# Patient Record
Sex: MALE | URBAN - METROPOLITAN AREA
[De-identification: names, ages, dates, MRNs, and addresses within clinical notes are randomized per-mention and may not be internally consistent; named-entity substitution may affect disease eponyms.]

---

## 2024-09-27 ENCOUNTER — APPOINTMENT (RX ONLY)
Dept: URBAN - METROPOLITAN AREA CLINIC 109 | Facility: CLINIC | Age: 19
Setting detail: DERMATOLOGY
End: 2024-09-27

## 2024-09-27 DIAGNOSIS — L20.89 OTHER ATOPIC DERMATITIS: ICD-10-CM | Status: INADEQUATELY CONTROLLED

## 2024-09-27 PROCEDURE — ? TREATMENT REGIMEN

## 2024-09-27 PROCEDURE — ? COUNSELING

## 2024-09-27 PROCEDURE — 99204 OFFICE O/P NEW MOD 45 MIN: CPT

## 2024-09-27 PROCEDURE — ? PRESCRIPTION

## 2024-09-27 RX ORDER — CLOBETASOL PROPIONATE 0.05 G/100ML
LOTION TOPICAL
Qty: 59 | Refills: 1 | Status: ERX | COMMUNITY
Start: 2024-09-27

## 2024-09-27 RX ADMIN — CLOBETASOL PROPIONATE: 0.05 LOTION TOPICAL at 00:00

## 2024-09-27 ASSESSMENT — BSA ECZEMA: % BODY COVERED IN ECZEMA: 8

## 2024-09-27 ASSESSMENT — ITCH NUMERIC RATING SCALE: HOW SEVERE IS YOUR ITCHING?: 7

## 2024-09-27 NOTE — HPI: RASH
How Severe Is Your Rash?: moderate
Is This A New Presentation, Or A Follow-Up?: Rash
Additional History: Patient wanted us to be aware that father had a similar rash and was diagnosed as gluten intolerance. Pt does not live with dad.

## 2024-09-27 NOTE — PROCEDURE: TREATMENT REGIMEN
Plan: Use a gentle skin care regiment
Detail Level: Zone
Initiate Treatment: clobetasol 0.05 % lotion